# Patient Record
Sex: FEMALE | Race: WHITE | NOT HISPANIC OR LATINO | URBAN - METROPOLITAN AREA
[De-identification: names, ages, dates, MRNs, and addresses within clinical notes are randomized per-mention and may not be internally consistent; named-entity substitution may affect disease eponyms.]

---
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
## 2021-06-30 ENCOUNTER — NON-PROVIDER VISIT (OUTPATIENT)
Dept: URGENT CARE | Facility: PHYSICIAN GROUP | Age: 21
End: 2021-06-30

## 2021-06-30 DIAGNOSIS — Z02.1 PRE-EMPLOYMENT DRUG SCREENING: ICD-10-CM

## 2021-06-30 PROCEDURE — 80305 DRUG TEST PRSMV DIR OPT OBS: CPT | Performed by: PHYSICIAN ASSISTANT

## 2021-11-12 ENCOUNTER — OFFICE VISIT (OUTPATIENT)
Dept: URGENT CARE | Facility: CLINIC | Age: 21
End: 2021-11-12
Payer: OTHER MISCELLANEOUS

## 2021-11-12 VITALS
WEIGHT: 100 LBS | BODY MASS INDEX: 17.07 KG/M2 | SYSTOLIC BLOOD PRESSURE: 122 MMHG | OXYGEN SATURATION: 98 % | HEART RATE: 82 BPM | DIASTOLIC BLOOD PRESSURE: 64 MMHG | TEMPERATURE: 98.9 F | RESPIRATION RATE: 16 BRPM | HEIGHT: 64 IN

## 2021-11-12 DIAGNOSIS — G44.209 TENSION HEADACHE: ICD-10-CM

## 2021-11-12 DIAGNOSIS — S16.1XXA STRAIN OF NECK MUSCLE, INITIAL ENCOUNTER: ICD-10-CM

## 2021-11-12 PROCEDURE — 99203 OFFICE O/P NEW LOW 30 MIN: CPT | Performed by: FAMILY MEDICINE

## 2021-11-12 RX ORDER — CYCLOBENZAPRINE HCL 5 MG
5 TABLET ORAL 2 TIMES DAILY PRN
Qty: 5 TABLET | Refills: 0 | Status: SHIPPED | OUTPATIENT
Start: 2021-11-12 | End: 2023-08-03

## 2021-11-12 ASSESSMENT — PAIN SCALES - GENERAL: PAINLEVEL: 5=MODERATE PAIN

## 2021-11-12 NOTE — PROGRESS NOTES
"  Subjective:      21 y.o. female presents to urgent care for migraine that started on Monday. There was no inciting event or trauma at that time. The pain is moving between her neck and temples bilaterally, it's described as pressure like and throbbing, currently rated 5/10. There is associated light sensitivity, but no nausea or noise sensitivity. No aura. Never had a migraine before. She has tried Ibuprofen, advil, heat/ice, and stretching - none of which helped alleviate her symptoms.     Headache red flags  -Rapid increase in headache frequency: no  -Lack of coordination: no  -Localized neurological signs or papilledema: no  -Headaches that awake patient from sleep: no  -Worse headache of their life: yes, although admittedly it is not that bad  -New onset of headache over the age of 50 years old: no  -Rapid onset of headache with strenuous exercise: no  -Current diagnosis of cancer, HIV, or Lyme disease: no    She denies any other questions or concerns at this time.    Current problem list, medication, and past medical/surgical history were reviewed in Epic.    ROS  See HPI     Objective:      /64   Pulse 82   Temp 37.2 °C (98.9 °F) (Temporal)   Resp 16   Ht 1.626 m (5' 4\")   Wt 45.4 kg (100 lb)   SpO2 98%   BMI 17.16 kg/m²     Physical Exam  Constitutional:       General: She is not in acute distress.     Appearance: She is not diaphoretic.   Cardiovascular:      Rate and Rhythm: Normal rate and regular rhythm.      Heart sounds: Normal heart sounds.   Pulmonary:      Effort: Pulmonary effort is normal. No respiratory distress.      Breath sounds: Normal breath sounds.   Musculoskeletal:      Comments: No discolorations or deformities noted to inspection of her neck. She is tender to palpation of her right, cervical region. Muscles in the same area feel very tight.   Neurological:      Mental Status: She is alert.      Comments: Cranial nerves II through XII grossly intact. Equal strength and " sensation to extremities x4.   Psychiatric:         Mood and Affect: Affect normal.         Judgment: Judgment normal.       Assessment/Plan:     1. Strain of neck muscle, initial encounter  2. Tension headache  Negative red flag screening. I believe headache is related to strain of neck muscles. We discussed the use of heat, exercises, ibuprofen, Tylenol, and Flexeril as needed for symptomatic relief.  - cyclobenzaprine (FLEXERIL) 5 mg tablet; Take 1 Tablet by mouth 2 times a day as needed for Moderate Pain or Muscle Spasms.  Dispense: 5 Tablet; Refill: 0      Instructed to return to Urgent Care or nearest Emergency Department if symptoms fail to improve, for any change in condition, further concerns, or new concerning symptoms. Patient states understanding of the plan of care and discharge instructions.    Vicki Jennings M.D.

## 2021-11-12 NOTE — LETTER
November 12, 2021    To Whom It May Concern:         This is confirmation that Mary Edge attended her scheduled appointment with Vicki Jennings M.D. on 11/12/21. She may return to work 11/13/2021 without any restrictions.          If you have any questions please do not hesitate to call me at the phone number listed below.    Sincerely,          Vicki Jennings M.D.  [phone]

## 2022-11-07 ENCOUNTER — OFFICE VISIT (OUTPATIENT)
Dept: URGENT CARE | Facility: PHYSICIAN GROUP | Age: 22
End: 2022-11-07
Payer: OTHER MISCELLANEOUS

## 2022-11-07 ENCOUNTER — APPOINTMENT (OUTPATIENT)
Dept: URGENT CARE | Facility: PHYSICIAN GROUP | Age: 22
End: 2022-11-07
Payer: OTHER MISCELLANEOUS

## 2022-11-07 VITALS
TEMPERATURE: 97.3 F | SYSTOLIC BLOOD PRESSURE: 105 MMHG | OXYGEN SATURATION: 97 % | WEIGHT: 105.2 LBS | HEART RATE: 78 BPM | DIASTOLIC BLOOD PRESSURE: 70 MMHG | BODY MASS INDEX: 17.96 KG/M2 | HEIGHT: 64 IN | RESPIRATION RATE: 18 BRPM

## 2022-11-07 DIAGNOSIS — R68.89 FLU-LIKE SYMPTOMS: ICD-10-CM

## 2022-11-07 PROCEDURE — 99213 OFFICE O/P EST LOW 20 MIN: CPT | Performed by: PHYSICIAN ASSISTANT

## 2022-11-07 NOTE — PROGRESS NOTES
"Subjective:   Mary Edge is a 22 y.o. female who presents for Pharyngitis (X4 sore throat, cough, headache, sore neck. Had fever 3 days Thurs.-Sat. 103 degrees)      HPI  The patient presents to the Urgent Care with complaints of flulike symptoms onset 4 days ago.  She reports of fever for the first 3 days recorded at 103F max.  Fevers resolved.  She has a continued cough and sore throat.  Occasional left chest wall pain and occasional shortness of breath.  Associated nasal congestion and runny nose.  She had an at-home COVID test 2 days ago which was negative. Denies any chest pain, vomiting, diarrhea. Received COVID vaccine.  She used to vape.      Medications:    cyclobenzaprine  ibuprofen Tabs    Allergies: Patient has no known allergies.    Problem List: Mary Edge does not have a problem list on file.    Surgical History:  No past surgical history on file.    Past Social Hx: Mary Edge  reports that she has quit smoking. She has never used smokeless tobacco. She reports that she does not drink alcohol.     Past Family Hx:  Mary Edge family history is not on file.     Problem list, medications, and allergies reviewed by myself today in Epic.     Objective:     /70 (BP Location: Left arm, Patient Position: Sitting, BP Cuff Size: Adult)   Pulse 78   Temp 36.3 °C (97.3 °F) (Temporal)   Resp 18   Ht 1.626 m (5' 4\")   Wt 47.7 kg (105 lb 3.2 oz)   SpO2 97%   BMI 18.06 kg/m²     Physical Exam  Vitals reviewed.   Constitutional:       General: She is not in acute distress.     Appearance: Normal appearance. She is not ill-appearing or toxic-appearing.   HENT:      Mouth/Throat:      Pharynx: Uvula midline. Posterior oropharyngeal erythema (trace) present. No pharyngeal swelling, oropharyngeal exudate or uvula swelling.      Tonsils: No tonsillar exudate or tonsillar abscesses.   Eyes:      Conjunctiva/sclera: Conjunctivae normal.      Pupils: Pupils are equal, round, and reactive to " light.   Cardiovascular:      Rate and Rhythm: Normal rate and regular rhythm.      Heart sounds: Normal heart sounds.   Pulmonary:      Effort: Pulmonary effort is normal. No respiratory distress.      Breath sounds: Normal breath sounds. No wheezing, rhonchi or rales.   Musculoskeletal:      Cervical back: Neck supple. No rigidity.   Lymphadenopathy:      Cervical: No cervical adenopathy.   Skin:     General: Skin is warm and dry.   Neurological:      General: No focal deficit present.      Mental Status: She is alert and oriented to person, place, and time.   Psychiatric:         Mood and Affect: Mood normal.         Behavior: Behavior normal.     Strep A: Negative  Influenza: Negative    COVID Antigen: Negative      Diagnosis and associated orders:     1. Flu-like symptoms  - POCT Influenza A/B  - POCT Rapid Strep A  - POCT SARS-COV Antigen CYNDY (Symptomatic only)     Comments/MDM:     Most likely viral etiology.   Symptomatic and supportive care:   Plenty of oral hydration and rest   Over the counter cough suppressant as directed.  Tylenol or ibuprofen for pain and fever as directed.   Warm salt water gargles for sore throat, soft foods, cool liquids.   Saline nasal spray and Flonase  Infection control measures at home. Stay away from people, Hand washing, covering sneeze/cough, disinfect surfaces.   Remain home from work, school, and other populated environments. Work note provided with information of quarantine measures per CDC guidelines.   Overall, the patient is well-appearing. They are not hypoxic, afebrile, and a normal pulmonary exam.     I personally reviewed prior external notes and test results pertinent to today's visit. Pathogenesis of diagnosis discussed including typical length and natural progression. Supportive care, natural history, differential diagnoses, and indications for immediate follow-up discussed. Patient expresses understanding and agrees to plan. Patient denies any other questions or  concerns.     Follow-up with the primary care physician for recheck, reevaluation, and consideration of further management.    Please note that this dictation was created using voice recognition software. I have made a reasonable attempt to correct obvious errors, but I expect that there are errors of grammar and possibly content that I did not discover before finalizing the note.    This note was electronically signed by Ziyad Kelsey PA-C

## 2022-11-07 NOTE — LETTER
November 7, 2022         Patient: Mary Edge   YOB: 2000   Date of Visit: 11/7/2022           To Whom it May Concern:    Mary Edge was seen in my clinic on 11/7/2022. Please excuse from work. May return on 11/09.     If you have any questions or concerns, please don't hesitate to call.        Sincerely,           Ziyad Kelsey P.A.-C.  Electronically Signed

## 2023-08-03 ENCOUNTER — OFFICE VISIT (OUTPATIENT)
Dept: URGENT CARE | Facility: PHYSICIAN GROUP | Age: 23
End: 2023-08-03
Payer: COMMERCIAL

## 2023-08-03 ENCOUNTER — HOSPITAL ENCOUNTER (OUTPATIENT)
Dept: RADIOLOGY | Facility: MEDICAL CENTER | Age: 23
End: 2023-08-03
Payer: COMMERCIAL

## 2023-08-03 VITALS
BODY MASS INDEX: 19.12 KG/M2 | WEIGHT: 112 LBS | HEART RATE: 69 BPM | RESPIRATION RATE: 18 BRPM | SYSTOLIC BLOOD PRESSURE: 114 MMHG | HEIGHT: 64 IN | OXYGEN SATURATION: 98 % | TEMPERATURE: 97.9 F | DIASTOLIC BLOOD PRESSURE: 70 MMHG

## 2023-08-03 DIAGNOSIS — M54.50 ACUTE MIDLINE LOW BACK PAIN WITHOUT SCIATICA: ICD-10-CM

## 2023-08-03 LAB
APPEARANCE UR: NORMAL
BILIRUB UR STRIP-MCNC: NORMAL MG/DL
COLOR UR AUTO: YELLOW
GLUCOSE UR STRIP.AUTO-MCNC: NORMAL MG/DL
KETONES UR STRIP.AUTO-MCNC: NORMAL MG/DL
LEUKOCYTE ESTERASE UR QL STRIP.AUTO: NORMAL
NITRITE UR QL STRIP.AUTO: NORMAL
PH UR STRIP.AUTO: 7 [PH] (ref 5–8)
POCT INT CON NEG: NEGATIVE
POCT INT CON POS: POSITIVE
POCT URINE PREGNANCY TEST: NEGATIVE
PROT UR QL STRIP: NORMAL MG/DL
RBC UR QL AUTO: NORMAL
SP GR UR STRIP.AUTO: 1.02
UROBILINOGEN UR STRIP-MCNC: 2 MG/DL

## 2023-08-03 PROCEDURE — 3074F SYST BP LT 130 MM HG: CPT

## 2023-08-03 PROCEDURE — 81025 URINE PREGNANCY TEST: CPT

## 2023-08-03 PROCEDURE — 99213 OFFICE O/P EST LOW 20 MIN: CPT

## 2023-08-03 PROCEDURE — 72100 X-RAY EXAM L-S SPINE 2/3 VWS: CPT

## 2023-08-03 PROCEDURE — 81002 URINALYSIS NONAUTO W/O SCOPE: CPT

## 2023-08-03 PROCEDURE — 3078F DIAST BP <80 MM HG: CPT

## 2023-08-03 RX ORDER — CYCLOBENZAPRINE HCL 5 MG
5-10 TABLET ORAL
Qty: 15 TABLET | Refills: 0 | Status: SHIPPED | OUTPATIENT
Start: 2023-08-03

## 2023-08-03 RX ORDER — PREDNISONE 10 MG/1
20 TABLET ORAL DAILY
Qty: 10 TABLET | Refills: 0 | Status: SHIPPED | OUTPATIENT
Start: 2023-08-03 | End: 2023-08-08

## 2023-08-03 RX ORDER — KETOROLAC TROMETHAMINE 30 MG/ML
15 INJECTION, SOLUTION INTRAMUSCULAR; INTRAVENOUS ONCE
Status: COMPLETED | OUTPATIENT
Start: 2023-08-03 | End: 2023-08-03

## 2023-08-03 RX ADMIN — KETOROLAC TROMETHAMINE 15 MG: 30 INJECTION, SOLUTION INTRAMUSCULAR; INTRAVENOUS at 16:14

## 2023-08-03 NOTE — PROGRESS NOTES
"Chief Complaint   Patient presents with    Abdominal Pain     Tingle sensation and pop. Comes in waves. Using ice but hurts to stand, left thigh tingling  X1 month         Subjective:   HISTORY OF PRESENT ILLNESS: Mary Edge is a 23 y.o. female who presents for low back pain x 1 month.  She was sitting at her desk at work and felt a pop in her lower back and was unable to walk for about 2.5 hours and then improved over time with conservative measure.  The pain  has ramped up again about 4 days ago.  No new aggravating factors  No fevers, no incontinence of urine or stool, no saddle anesthesia, 1 episode of left thigh tingling but that resolved.         Medications, Allergies, current problem list, Social and Family history reviewed today in Epic.     Objective:     /70 (BP Location: Left arm, Patient Position: Sitting, BP Cuff Size: Adult)   Pulse 69   Temp 36.6 °C (97.9 °F) (Temporal)   Resp 18   Ht 1.626 m (5' 4\")   Wt 50.8 kg (112 lb)   SpO2 98%     Physical Exam  Vitals reviewed.   Constitutional:       Appearance: Normal appearance.   HENT:      Mouth/Throat:      Mouth: Mucous membranes are moist.   Cardiovascular:      Rate and Rhythm: Normal rate.   Pulmonary:      Effort: Pulmonary effort is normal.   Musculoskeletal:      Comments: 5/5 strength in all extremities,  midline lumbar tenderness without step offs or edema, skin intact.    Skin:     General: Skin is warm and dry.   Neurological:      Mental Status: She is alert and oriented to person, place, and time.      Cranial Nerves: Cranial nerves 2-12 are intact.      Sensory: Sensation is intact.      Motor: Motor function is intact.      Coordination: Coordination is intact.      Gait: Gait is intact.   Psychiatric:         Mood and Affect: Mood normal.          Assessment/Plan:     Diagnosis and associated orders    1. Acute midline low back pain without sciatica  DX-LUMBAR SPINE-2 OR 3 VIEWS    POCT Urinalysis    POCT Pregnancy    " ketorolac (Toradol) injection 15 mg    cyclobenzaprine (FLEXERIL) 5 mg tablet    predniSONE (DELTASONE) 10 MG Tab    Referral to Physical Therapy          I personally reviewed prior external notes and test results pertinent to today's visit.       IMPRESSION: Patient is non-toxic appearing and suitable for outpatient care at this time.  Symptoms and exam are consistent with muscle strain.   Exam findings reassuring with no motor weakness or no sensory deficits.  We will treat with muscle relaxes, toradol and a shoort course of prednisone. This is recurrent and ongoing for about 1 month, I have referred her to PT. And instructed to follow up with her PCP    Educated on red flag symptoms and Instructed patient to return to Urgent Care or nearest Emergency Department if symptoms fail to improve, for any change in condition, further concerns, or new concerning symptoms. Patient states understanding of the plan of care and discharge instructions.        Please note that this dictation was created using voice recognition software. I have made a reasonable attempt to correct obvious errors, but I expect that there are errors of grammar and possibly content that I did not discover before finalizing the note.    This note was electronically signed by SANDY Diego

## 2024-03-12 ENCOUNTER — OFFICE VISIT (OUTPATIENT)
Dept: URGENT CARE | Facility: PHYSICIAN GROUP | Age: 24
End: 2024-03-12
Payer: COMMERCIAL

## 2024-03-12 VITALS
OXYGEN SATURATION: 98 % | HEART RATE: 78 BPM | HEIGHT: 64 IN | SYSTOLIC BLOOD PRESSURE: 108 MMHG | DIASTOLIC BLOOD PRESSURE: 60 MMHG | RESPIRATION RATE: 12 BRPM | BODY MASS INDEX: 20.66 KG/M2 | WEIGHT: 121 LBS | TEMPERATURE: 97.9 F

## 2024-03-12 DIAGNOSIS — J01.90 ACUTE RHINOSINUSITIS: ICD-10-CM

## 2024-03-12 DIAGNOSIS — H93.8X3 SENSATION OF FULLNESS IN BOTH EARS: ICD-10-CM

## 2024-03-12 PROCEDURE — 3074F SYST BP LT 130 MM HG: CPT | Performed by: STUDENT IN AN ORGANIZED HEALTH CARE EDUCATION/TRAINING PROGRAM

## 2024-03-12 PROCEDURE — 3078F DIAST BP <80 MM HG: CPT | Performed by: STUDENT IN AN ORGANIZED HEALTH CARE EDUCATION/TRAINING PROGRAM

## 2024-03-12 PROCEDURE — 99213 OFFICE O/P EST LOW 20 MIN: CPT | Performed by: STUDENT IN AN ORGANIZED HEALTH CARE EDUCATION/TRAINING PROGRAM

## 2024-03-12 RX ORDER — AMOXICILLIN AND CLAVULANATE POTASSIUM 875; 125 MG/1; MG/1
1 TABLET, FILM COATED ORAL 2 TIMES DAILY
Qty: 14 TABLET | Refills: 0 | Status: SHIPPED | OUTPATIENT
Start: 2024-03-12 | End: 2024-03-19

## 2024-03-12 RX ORDER — FLUTICASONE PROPIONATE 50 MCG
1 SPRAY, SUSPENSION (ML) NASAL DAILY
Qty: 16 G | Refills: 0 | Status: SHIPPED | OUTPATIENT
Start: 2024-03-12

## 2024-03-12 ASSESSMENT — ENCOUNTER SYMPTOMS
FEVER: 0
WHEEZING: 0
SHORTNESS OF BREATH: 0
CHILLS: 0
SORE THROAT: 0
COUGH: 1
PALPITATIONS: 0
SINUS PAIN: 1

## 2024-03-12 NOTE — PROGRESS NOTES
"Subjective     Mary Edge is a 23 y.o. female who presents with Ear Fullness (Have been very muffled and has been congested for about 10 days patient states no home remedies are helping any longer. Patient has done everything including ear drops and that has not helped. )            Mary is a 23 y.o. female who presents to urgent care with bilateral ear fullness.  Patient reports hearing muffled in bilateral ears.  Symptoms started initially 10 days ago.  Patient is also experiencing associated nasal congestion and sinus pain/pressure.  Patient has been taking OTC cold/flu medications and OTC decongestant with minimal relief of symptoms.  Patient has also tried cleaning out her ears with hydrogen peroxide and OTC eardrops with no relief.        Review of Systems   Constitutional:  Positive for malaise/fatigue. Negative for chills and fever.   HENT:  Positive for congestion and sinus pain. Negative for ear pain and sore throat.    Respiratory:  Positive for cough. Negative for shortness of breath and wheezing.    Cardiovascular:  Negative for chest pain and palpitations.   All other systems reviewed and are negative.             Objective     /60   Pulse 78   Temp 36.6 °C (97.9 °F) (Temporal)   Resp 12   Ht 1.626 m (5' 4\")   Wt 54.9 kg (121 lb)   LMP 03/05/2024 (Approximate)   SpO2 98%   BMI 20.77 kg/m²      Physical Exam  Vitals reviewed.   Constitutional:       General: She is not in acute distress.     Appearance: Normal appearance. She is not toxic-appearing.   HENT:      Head: Normocephalic and atraumatic.      Right Ear: Tympanic membrane, ear canal and external ear normal.      Left Ear: Tympanic membrane, ear canal and external ear normal.      Nose: Congestion and rhinorrhea present.      Right Sinus: Maxillary sinus tenderness present.      Left Sinus: Maxillary sinus tenderness present.      Mouth/Throat:      Lips: Pink.      Mouth: Mucous membranes are moist.      Pharynx: Oropharynx " is clear. Uvula midline.   Eyes:      Extraocular Movements: Extraocular movements intact.      Conjunctiva/sclera: Conjunctivae normal.      Pupils: Pupils are equal, round, and reactive to light.   Cardiovascular:      Rate and Rhythm: Normal rate and regular rhythm.   Pulmonary:      Effort: Pulmonary effort is normal.      Breath sounds: Normal breath sounds.   Skin:     General: Skin is warm and dry.   Neurological:      General: No focal deficit present.      Mental Status: She is alert.                             Assessment & Plan        1. Acute rhinosinusitis  - amoxicillin-clavulanate (AUGMENTIN) 875-125 MG Tab; Take 1 Tablet by mouth 2 times a day for 7 days.  Dispense: 14 Tablet; Refill: 0  - fluticasone (FLONASE) 50 MCG/ACT nasal spray; Administer 1 Spray into affected nostril(S) every day.  Dispense: 16 g; Refill: 0    2. Sensation of fullness in both ears      Differential diagnoses, supportive care measures and indications for immediate follow-up discussed with patient. Pathogenesis of diagnosis discussed including typical length and natural progression.      Instructed to return to urgent care or nearest emergency department if symptoms fail to improve, for any change in condition, further concerns, or new concerning symptoms.    Patient states understanding and agrees with the plan of care and discharge instructions.

## 2024-06-17 ENCOUNTER — OFFICE VISIT (OUTPATIENT)
Dept: URGENT CARE | Facility: PHYSICIAN GROUP | Age: 24
End: 2024-06-17
Payer: COMMERCIAL

## 2024-06-17 VITALS
DIASTOLIC BLOOD PRESSURE: 58 MMHG | WEIGHT: 122 LBS | HEIGHT: 64 IN | RESPIRATION RATE: 18 BRPM | BODY MASS INDEX: 20.83 KG/M2 | TEMPERATURE: 97.6 F | SYSTOLIC BLOOD PRESSURE: 116 MMHG | HEART RATE: 77 BPM | OXYGEN SATURATION: 98 %

## 2024-06-17 DIAGNOSIS — J02.9 ACUTE PHARYNGITIS, UNSPECIFIED ETIOLOGY: ICD-10-CM

## 2024-06-17 DIAGNOSIS — B34.9 NONSPECIFIC SYNDROME SUGGESTIVE OF VIRAL ILLNESS: ICD-10-CM

## 2024-06-17 LAB
FLUAV RNA SPEC QL NAA+PROBE: NEGATIVE
FLUBV RNA SPEC QL NAA+PROBE: NEGATIVE
RSV RNA SPEC QL NAA+PROBE: NEGATIVE
S PYO DNA SPEC NAA+PROBE: NOT DETECTED
SARS-COV-2 RNA RESP QL NAA+PROBE: NEGATIVE

## 2024-06-17 PROCEDURE — 87651 STREP A DNA AMP PROBE: CPT

## 2024-06-17 PROCEDURE — 3078F DIAST BP <80 MM HG: CPT

## 2024-06-17 PROCEDURE — 0241U POCT CEPHEID COV-2, FLU A/B, RSV - PCR: CPT

## 2024-06-17 PROCEDURE — 99214 OFFICE O/P EST MOD 30 MIN: CPT

## 2024-06-17 PROCEDURE — 3074F SYST BP LT 130 MM HG: CPT

## 2024-06-17 ASSESSMENT — ENCOUNTER SYMPTOMS
VOMITING: 0
SORE THROAT: 1
FEVER: 0
CHILLS: 0
NAUSEA: 0

## 2024-06-17 NOTE — PROGRESS NOTES
"  CHIEF COMPLAINT  Chief Complaint   Patient presents with    Pharyngitis     X 1 day Sore throat, headache, ears congested     Subjective:   Mary Edge is a 23 y.o. female who presents to urgent care with concerns for sore throat x 1 day.  Patient also reports associated symptoms of headache, earache and nasal congestion.  She denies any symptoms of fever or chills.  She denies any nausea or vomiting.  No other pertinent past medical history.          Review of Systems   Constitutional:  Negative for chills and fever.   HENT:  Positive for congestion and sore throat.    Gastrointestinal:  Negative for nausea and vomiting.       PAST MEDICAL HISTORY  There are no problems to display for this patient.      SURGICAL HISTORY  patient denies any surgical history    ALLERGIES  No Known Allergies    CURRENT MEDICATIONS  Home Medications       Reviewed by Vince Obando'osbaldo (Medical Assistant) on 06/17/24 at 0924  Med List Status: <None>     Medication Last Dose Status   cyclobenzaprine (FLEXERIL) 5 mg tablet Not Taking Active   fluticasone (FLONASE) 50 MCG/ACT nasal spray Not Taking Active   ibuprofen (MOTRIN) 800 MG Tab Not Taking Active                    SOCIAL HISTORY  Social History     Tobacco Use    Smoking status: Former    Smokeless tobacco: Never   Vaping Use    Vaping status: Former    Quit date: 8/1/2023   Substance and Sexual Activity    Alcohol use: Yes     Alcohol/week: 4.2 oz     Types: 7 Cans of beer per week     Comment: beer every day    Drug use: Never    Sexual activity: Yes     Partners: Male     Birth control/protection: Condom       FAMILY HISTORY  History reviewed. No pertinent family history.      Medications, Allergies, and current problem list reviewed today in Epic.     Objective:     /58   Pulse 77   Temp 36.4 °C (97.6 °F) (Temporal)   Resp 18   Ht 1.626 m (5' 4\")   Wt 55.3 kg (122 lb)   SpO2 98%     Physical Exam  Vitals reviewed.   Constitutional:       General: " She is not in acute distress.     Appearance: Normal appearance. She is not ill-appearing or toxic-appearing.   HENT:      Head: Normocephalic.      Right Ear: Tympanic membrane normal.      Left Ear: Tympanic membrane normal.      Nose: Nose normal.      Mouth/Throat:      Mouth: Mucous membranes are moist.      Pharynx: Oropharynx is clear. Uvula midline. Posterior oropharyngeal erythema present. No oropharyngeal exudate.      Tonsils: 1+ on the right. 1+ on the left.   Cardiovascular:      Rate and Rhythm: Normal rate and regular rhythm.      Pulses: Normal pulses.      Heart sounds: Normal heart sounds.   Pulmonary:      Effort: Pulmonary effort is normal. No respiratory distress.      Breath sounds: Normal breath sounds. No stridor. No wheezing, rhonchi or rales.   Musculoskeletal:      Cervical back: Normal range of motion. No rigidity or tenderness.   Lymphadenopathy:      Cervical: No cervical adenopathy.   Skin:     General: Skin is warm.      Capillary Refill: Capillary refill takes less than 2 seconds.   Neurological:      General: No focal deficit present.      Mental Status: She is alert.   Psychiatric:         Mood and Affect: Mood normal.         Lab Results/POC Test Results   Results for orders placed or performed in visit on 06/17/24   POCT GROUP A STREP, PCR   Result Value Ref Range    POC Group A Strep, PCR Not Detected Not Detected, Invalid   POCT CoV-2, Flu A/B, RSV by PCR   Result Value Ref Range    SARS-CoV-2 by PCR Negative Negative, Invalid    Influenza virus A RNA Negative Negative, Invalid    Influenza virus B, PCR Negative Negative, Invalid    RSV, PCR Negative Negative, Invalid          Assessment/Plan:     Diagnosis and associated orders:     1. Acute pharyngitis, unspecified etiology  POCT GROUP A STREP, PCR    POCT CoV-2, Flu A/B, RSV by PCR      2. Nonspecific syndrome suggestive of viral illness           Comments/MDM:     The patient presents with symptoms suspicious for likely  viral upper respiratory infection. Differential includes bacterial pneumonia, sinusitis, allergic rhinitis. Patient is nontoxic appearing and not in need of emergent medical intervention. They have a normal pulse oximetry on room air, afebrile, and a normal pulmonary exam. Overall, the patient is very well appearing. I do not feel that this patient would benefit from antibiotics at this time.   Recommended symptomatic and supportive care at this time that includes plenty of fluids, rest, Tylenol/Ibuprofen for pain/fever, warm salt water gargles for sore throat, OTC cough and decongestant medication, Flonase, nasal saline washes.    Red flag signs and symptoms discussed.  Instructed to return to ER or urgent care if symptoms worsen or fail to improve.          Differential diagnosis, natural history, supportive care, and indications for immediate follow-up discussed.    Advised the patient to follow-up with the primary care physician for recheck, reevaluation, and consideration of further management.    Please note that this dictation was created using voice recognition software. I have made a reasonable attempt to correct obvious errors, but I expect that there are errors of grammar and possibly content that I did not discover before finalizing the note.    This note was electronically signed by SANDY Dove

## 2024-06-20 ENCOUNTER — OFFICE VISIT (OUTPATIENT)
Dept: URGENT CARE | Facility: CLINIC | Age: 24
End: 2024-06-20
Payer: COMMERCIAL

## 2024-06-20 ENCOUNTER — HOSPITAL ENCOUNTER (OUTPATIENT)
Facility: MEDICAL CENTER | Age: 24
End: 2024-06-20
Payer: COMMERCIAL

## 2024-06-20 VITALS
WEIGHT: 121 LBS | DIASTOLIC BLOOD PRESSURE: 70 MMHG | HEART RATE: 82 BPM | SYSTOLIC BLOOD PRESSURE: 102 MMHG | BODY MASS INDEX: 20.66 KG/M2 | HEIGHT: 64 IN | RESPIRATION RATE: 16 BRPM | TEMPERATURE: 97.6 F | OXYGEN SATURATION: 97 %

## 2024-06-20 DIAGNOSIS — J03.90 TONSILLITIS: ICD-10-CM

## 2024-06-20 LAB
HETEROPH AB SER QL LA: NEGATIVE
POCT INT CON NEG: NEGATIVE
POCT INT CON POS: POSITIVE

## 2024-06-20 PROCEDURE — 3074F SYST BP LT 130 MM HG: CPT

## 2024-06-20 PROCEDURE — 87070 CULTURE OTHR SPECIMN AEROBIC: CPT

## 2024-06-20 PROCEDURE — 99214 OFFICE O/P EST MOD 30 MIN: CPT

## 2024-06-20 PROCEDURE — 3078F DIAST BP <80 MM HG: CPT

## 2024-06-20 PROCEDURE — 86308 HETEROPHILE ANTIBODY SCREEN: CPT

## 2024-06-20 RX ORDER — LIDOCAINE HYDROCHLORIDE 20 MG/ML
15 SOLUTION OROPHARYNGEAL PRN
Qty: 100 ML | Refills: 0 | Status: SHIPPED | OUTPATIENT
Start: 2024-06-20

## 2024-06-20 RX ORDER — DEXAMETHASONE SODIUM PHOSPHATE 10 MG/ML
10 INJECTION INTRAMUSCULAR; INTRAVENOUS ONCE
Status: COMPLETED | OUTPATIENT
Start: 2024-06-20 | End: 2024-06-20

## 2024-06-20 RX ADMIN — DEXAMETHASONE SODIUM PHOSPHATE 10 MG: 10 INJECTION INTRAMUSCULAR; INTRAVENOUS at 20:06

## 2024-06-20 ASSESSMENT — ENCOUNTER SYMPTOMS
FEVER: 0
CHILLS: 1
COUGH: 1
MYALGIAS: 1
SORE THROAT: 1

## 2024-06-21 DIAGNOSIS — J03.90 TONSILLITIS: ICD-10-CM

## 2024-06-21 NOTE — PROGRESS NOTES
Subjective:     CHIEF COMPLAINT  Chief Complaint   Patient presents with    Pharyngitis     Throat pain and difficulty swallowing. Seen at [facility] on Monday and negative for strep and Covid/Flu/RSV, but it's getting worse       HPI  Mary Edge is a very pleasant 23 y.o. female who presents accompanied by her significant other with a sore throat, congestion, and cough.  She was previously seen in the urgent care on 6/17/2024 and had negative viral testing for COVID, influenza, and RSV as well as negative strep testing.  She has been performing warm salt water gargles, but reports that her sore throat has been progressively worsening.  She has had trouble eating and drinking.  She has not had any fevers.    REVIEW OF SYSTEMS  Review of Systems   Constitutional:  Positive for chills. Negative for fever.   HENT:  Positive for congestion and sore throat.    Respiratory:  Positive for cough.    Musculoskeletal:  Positive for myalgias.       PAST MEDICAL HISTORY  There are no problems to display for this patient.      SURGICAL HISTORY  patient denies any surgical history    ALLERGIES  No Known Allergies    CURRENT MEDICATIONS  Home Medications       Reviewed by Scarlett Snyder P.A.-C. (Physician Assistant) on 06/20/24 at 1951  Med List Status: <None>     Medication Last Dose Status        Patient Juan Alberto Taking any Medications                           SOCIAL HISTORY  Social History     Tobacco Use    Smoking status: Former    Smokeless tobacco: Never   Vaping Use    Vaping status: Former    Quit date: 8/1/2023   Substance and Sexual Activity    Alcohol use: Yes     Alcohol/week: 4.2 oz     Types: 7 Cans of beer per week     Comment: beer every day    Drug use: Never    Sexual activity: Yes     Partners: Male     Birth control/protection: Condom       FAMILY HISTORY  History reviewed. No pertinent family history.       Objective:     VITAL SIGNS: /70 (BP Location: Left arm, Patient Position:  "Sitting, BP Cuff Size: Adult)   Pulse 82   Temp 36.4 °C (97.6 °F) (Temporal)   Resp 16   Ht 1.626 m (5' 4\")   Wt 54.9 kg (121 lb)   SpO2 97%   BMI 20.77 kg/m²     PHYSICAL EXAM  Physical Exam  Vitals reviewed. Exam conducted with a chaperone present.   Constitutional:       General: She is not in acute distress.     Appearance: Normal appearance. She is not ill-appearing or toxic-appearing.   HENT:      Head: Normocephalic and atraumatic.      Right Ear: Tympanic membrane, ear canal and external ear normal.      Left Ear: Tympanic membrane, ear canal and external ear normal.      Nose: Congestion present.      Mouth/Throat:      Mouth: Mucous membranes are moist.      Pharynx: Oropharyngeal exudate and posterior oropharyngeal erythema present.      Comments: Uvula midline.  Tonsils 2+ bilaterally with exudate present.  Pharyngeal swelling is present.  Airways patent.  No evidence of peritonsillar abscess.  Eyes:      Conjunctiva/sclera: Conjunctivae normal.      Pupils: Pupils are equal, round, and reactive to light.   Cardiovascular:      Rate and Rhythm: Normal rate and regular rhythm.      Heart sounds: Normal heart sounds.   Pulmonary:      Effort: Pulmonary effort is normal. No respiratory distress.      Breath sounds: Normal breath sounds. No stridor. No wheezing, rhonchi or rales.   Lymphadenopathy:      Cervical: Cervical adenopathy present.   Skin:     General: Skin is warm and dry.      Coloration: Skin is not pale.      Findings: No rash.   Neurological:      General: No focal deficit present.      Mental Status: She is alert and oriented to person, place, and time.   Psychiatric:         Mood and Affect: Mood normal.         Assessment/Plan:     1. Tonsillitis  - CULTURE THROAT; Future  - POCT Mononucleosis (mono)  - dexamethasone (Decadron) injection (check route below) 10 mg  - lidocaine (XYLOCAINE) 2 % Solution; Take 15 mL by mouth as needed for Throat/Mouth Pain (Swish, gargle, and spit up to " 5 times daily for sore throat).  Dispense: 100 mL; Refill: 0  -Warm salt water gargles as needed for sore throat  -Tylenol OTC as needed for pain  -Return to clinic if symptoms worsen or fail to resolve      MDM/Comments:  Patient has stable vital signs and is non-toxic appearing.  Mono testing performed in office with negative results.  Throat culture obtained.  Patient provided an oral dose of Decadron for tonsillitis.  Lidocaine mouthwash provided for symptomatic relief of pain.  Discussed supportive care measures with warm salt water gargles, rest, tylenol OTC as needed for pain, and maintaining adequate hydration. Patient demonstrated understanding of treatment plan and agreed to return to the clinic if symptoms worsen or fail to resolve.     Differential diagnosis, natural history, supportive care, and indications for immediate follow-up discussed. All questions answered. Patient agrees with the plan of care.    Follow-up as needed if symptoms worsen or fail to improve to PCP, Urgent care or Emergency Room.    I have personally reviewed prior external notes and test results pertinent to today's visit.  I have independently reviewed and interpreted all diagnostics ordered during this urgent care acute visit.   Discussed management options (risks,benefits, and alternatives to treatment). Pt expresses understanding and the treatment plan was agreed upon. Questions were encouraged and answered to pt's satisfaction.    Please note that this dictation was created using voice recognition software. I have made a reasonable attempt to correct obvious errors, but I expect that there are errors of grammar and possibly content that I did not discover before finalizing the note.

## 2024-06-23 LAB
BACTERIA SPEC RESP CULT: NORMAL
SIGNIFICANT IND 70042: NORMAL
SITE SITE: NORMAL
SOURCE SOURCE: NORMAL

## 2024-09-19 RX ORDER — LIDOCAINE HYDROCHLORIDE 20 MG/ML
SOLUTION OROPHARYNGEAL
Qty: 100 ML | Refills: 0 | OUTPATIENT
Start: 2024-09-19

## 2024-10-03 ENCOUNTER — HOSPITAL ENCOUNTER (OUTPATIENT)
Dept: RADIOLOGY | Facility: MEDICAL CENTER | Age: 24
End: 2024-10-03
Payer: COMMERCIAL

## 2024-10-03 ENCOUNTER — OFFICE VISIT (OUTPATIENT)
Dept: URGENT CARE | Facility: PHYSICIAN GROUP | Age: 24
End: 2024-10-03
Payer: COMMERCIAL

## 2024-10-03 VITALS
RESPIRATION RATE: 13 BRPM | OXYGEN SATURATION: 99 % | BODY MASS INDEX: 21.02 KG/M2 | SYSTOLIC BLOOD PRESSURE: 126 MMHG | HEIGHT: 64 IN | HEART RATE: 76 BPM | TEMPERATURE: 98.8 F | DIASTOLIC BLOOD PRESSURE: 82 MMHG | WEIGHT: 123.1 LBS

## 2024-10-03 DIAGNOSIS — R07.9 CHEST PAIN, UNSPECIFIED TYPE: ICD-10-CM

## 2024-10-03 DIAGNOSIS — R20.0 RIGHT ARM NUMBNESS: ICD-10-CM

## 2024-10-03 PROCEDURE — 93000 ELECTROCARDIOGRAM COMPLETE: CPT

## 2024-10-03 PROCEDURE — 99215 OFFICE O/P EST HI 40 MIN: CPT

## 2024-10-03 PROCEDURE — 3079F DIAST BP 80-89 MM HG: CPT

## 2024-10-03 PROCEDURE — 71046 X-RAY EXAM CHEST 2 VIEWS: CPT

## 2024-10-03 PROCEDURE — 3074F SYST BP LT 130 MM HG: CPT

## 2024-10-03 ASSESSMENT — ENCOUNTER SYMPTOMS
HEARTBURN: 1
FEVER: 0
SHORTNESS OF BREATH: 0

## 2025-06-17 ENCOUNTER — HOSPITAL ENCOUNTER (OUTPATIENT)
Dept: RADIOLOGY | Facility: MEDICAL CENTER | Age: 25
End: 2025-06-17
Payer: COMMERCIAL

## 2025-06-17 ENCOUNTER — OFFICE VISIT (OUTPATIENT)
Dept: URGENT CARE | Facility: PHYSICIAN GROUP | Age: 25
End: 2025-06-17
Payer: COMMERCIAL

## 2025-06-17 VITALS
HEART RATE: 71 BPM | HEIGHT: 64 IN | TEMPERATURE: 97.3 F | BODY MASS INDEX: 21.51 KG/M2 | WEIGHT: 126 LBS | OXYGEN SATURATION: 99 % | RESPIRATION RATE: 18 BRPM | DIASTOLIC BLOOD PRESSURE: 56 MMHG | SYSTOLIC BLOOD PRESSURE: 110 MMHG

## 2025-06-17 DIAGNOSIS — S69.92XA INJURY OF LEFT THUMB, INITIAL ENCOUNTER: Primary | ICD-10-CM

## 2025-06-17 PROCEDURE — 99214 OFFICE O/P EST MOD 30 MIN: CPT

## 2025-06-17 PROCEDURE — 73140 X-RAY EXAM OF FINGER(S): CPT | Mod: LT

## 2025-06-17 PROCEDURE — 3074F SYST BP LT 130 MM HG: CPT

## 2025-06-17 PROCEDURE — 3078F DIAST BP <80 MM HG: CPT

## 2025-06-17 ASSESSMENT — ENCOUNTER SYMPTOMS
VOMITING: 0
FEVER: 0
WEAKNESS: 0
DIARRHEA: 0
SHORTNESS OF BREATH: 0
DIZZINESS: 0
COUGH: 0
NAUSEA: 0

## 2025-06-17 NOTE — PROGRESS NOTES
"Subjective     Mary Edge is a 24 y.o. female who presents with Hand Injury (X 2 months ago jammed Lft thumb still having tenderness)            Was playing football ~2 months ago and when she caught the ball her thumb bent the wrong way/jammed   dad did not think it was broken at the time  Took 800mg ibuprofen day of injury  Iced injury for 2 weeks  Had bruising for about 2 weeks   Improved 50% from initial injury but has been at plateau for 6 weeks  Decreased range of motion states she \"used to be double jointed\" but now is not, feels weak        Review of Systems   Constitutional:  Negative for fever and malaise/fatigue.   HENT:  Negative for congestion.    Respiratory:  Negative for cough and shortness of breath.    Cardiovascular:  Negative for chest pain.   Gastrointestinal:  Negative for diarrhea, nausea and vomiting.   Musculoskeletal:  Positive for joint pain.   Skin:  Negative for rash.   Neurological:  Negative for dizziness and weakness.   All other systems reviewed and are negative.             Objective     /56   Pulse 71   Temp 36.3 °C (97.3 °F)   Resp 18   Ht 1.626 m (5' 4\")   Wt 57.2 kg (126 lb)   SpO2 99%   BMI 21.63 kg/m²      Physical Exam  Vitals reviewed.   Constitutional:       Appearance: Normal appearance.   HENT:      Head: Normocephalic.   Eyes:      Extraocular Movements: Extraocular movements intact.      Conjunctiva/sclera: Conjunctivae normal.   Cardiovascular:      Rate and Rhythm: Normal rate.   Pulmonary:      Effort: Pulmonary effort is normal.   Musculoskeletal:      Left hand: No bony tenderness. Decreased range of motion. Decreased strength. Normal sensation. Normal capillary refill. Normal pulse.   Skin:     General: Skin is warm and dry.   Neurological:      Mental Status: She is alert and oriented to person, place, and time.   Psychiatric:         Behavior: Behavior normal.                                  Assessment & Plan  Injury of left thumb, " initial encounter    Orders:    DX-FINGER(S) 2+ LEFT    Referral to Sports Medicine       6/17/2025 1:30 PM     HISTORY/REASON FOR EXAM:  Pain/Deformity Following Trauma        TECHNIQUE/EXAM DESCRIPTION AND NUMBER OF VIEWS:  3 views of the LEFT fingers.     COMPARISON: None     FINDINGS:     No acute fracture or dislocation.     No joint osteoarthritis.     IMPRESSION:     No acute osseous abnormality.      Updated that x-ray was negative for fracture or dislocation. Discussed this could be related to a sprain, strain, or nerve problem.    Encouraged PRICE therapy as well regular ibuprofen to decrease inflammation.    Referral to Sports Med    Shared decision-making was utilized with Mary for plan of care. Discussed differential diagnoses, natural history, and supportive care. Reviewed indication for use and the potential benefits and side effects of medications. Mary was encouraged to monitor symptoms closely and educated about signs and symptoms that would warrant concern and mandate seeking a higher level of service through the emergency department discussed at length. All questions answered to Mary's satisfaction and they were in agreement with treatment plan at time of discharge.

## 2025-06-23 NOTE — Clinical Note
REFERRAL APPROVAL NOTICE         Sent on June 23, 2025                   Mary Edge  [address] [place] [address]                   Dear Ms. Edge,    After a careful review of the medical information and benefit coverage, [facility] has processed your referral. See below for additional details.    If applicable, you must be actively enrolled with your insurance for coverage of the authorized service. If you have any questions regarding your coverage, please contact your insurance directly.    REFERRAL INFORMATION   Referral #:  [id number]  Referred-To Department    Referred-By Provider:  Sports Medicine    SANDY George   [address]      [address]  [place] [address]  [phone] [address] [facility] [place] [id number] [phone]    Referral Start Date:  06/17/2025  Referral End Date:   06/17/2026             SCHEDULING  If you do not already have an appointment, please call [phone] to make an appointment.     MORE INFORMATION  If you do not already have a [employer] account, sign up at: [email or web address]  You can access your medical information, make appointments, see lab results, billing information, and more.  If you have questions regarding this referral, please contact  the [facility] Referrals department at:             [phone]. Monday - Friday 8:00AM - 5:00PM.     Sincerely,    [facility]

## 2025-07-01 ENCOUNTER — APPOINTMENT (OUTPATIENT)
Dept: SPORTS MEDICINE | Facility: OTHER | Age: 25
End: 2025-07-01
Payer: COMMERCIAL

## 2025-07-01 VITALS
HEIGHT: 64 IN | HEART RATE: 74 BPM | SYSTOLIC BLOOD PRESSURE: 116 MMHG | OXYGEN SATURATION: 98 % | DIASTOLIC BLOOD PRESSURE: 70 MMHG | RESPIRATION RATE: 14 BRPM | WEIGHT: 126 LBS | BODY MASS INDEX: 21.51 KG/M2 | TEMPERATURE: 98.1 F
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
## 2025-07-01 DIAGNOSIS — M79.645 THUMB PAIN, LEFT: Primary | ICD-10-CM

## 2025-07-01 PROCEDURE — 3078F DIAST BP <80 MM HG: CPT | Performed by: FAMILY MEDICINE

## 2025-07-01 PROCEDURE — 3074F SYST BP LT 130 MM HG: CPT | Performed by: FAMILY MEDICINE

## 2025-07-01 PROCEDURE — 99214 OFFICE O/P EST MOD 30 MIN: CPT | Performed by: FAMILY MEDICINE

## 2025-07-01 ASSESSMENT — ENCOUNTER SYMPTOMS
CHILLS: 0
NAUSEA: 0
DIZZINESS: 0
SHORTNESS OF BREATH: 0
VOMITING: 0
FEVER: 0

## 2025-07-01 NOTE — PROGRESS NOTES
"Chief Complaint   Patient presents with    Finger Pain     L thumb pain      Subjective   Notes  Referred by AUDIE George  for evaluation of LEFT thumb pain/injury (right-handed dominant)  Date of injury, Sunday, April 28, 2025  Mechanism, playing football and making a catch and jammed LEFT thumb  I took approximately 6 weeks for her to improve from her injury to baseline  Her pain has been persistent and predominantly at the proximal phalanx of the LEFT thumb  Worse with gripping and extending the finger  Her thumb used to be more mobile as she is \"double jointed\"  She has no longer been able to move the thumb which she used to  Pain is 0 out of 10, can be as severe as 5 out of 10 with use of the hand  She was lifting weights and notices that when she , pushes and pulls she experiences some pain  No numbness or tingling   She was seen at urgent care, had x-rays and referred for further evaluation and management  She denies any prior injuries or issues with the LEFT hand  She has tried icing and ibuprofen with limited improvement in symptoms    Also has lumbar spine issues    She has a desk job/computer work  Activities include attending the gym, weightlifting and doing yoga    Review of Systems   Constitutional:  Negative for chills and fever.   Respiratory:  Negative for shortness of breath.    Cardiovascular:  Negative for chest pain.   Gastrointestinal:  Negative for nausea and vomiting.   Neurological:  Negative for dizziness.     PMH:  has no past medical history on file.  MEDS: Current Medications[1]  ALLERGIES: Allergies[2]  SURGHX: Past Surgical History[3]  SOCHX:  reports that she has quit smoking. She has never used smokeless tobacco. She reports that she does not currently use alcohol after a past usage of about 4.2 oz of alcohol per week. She reports that she does not use drugs.  FH: Family history was reviewed, no pertinent findings to report    Objective   /70 (BP Location: Left " "arm, Patient Position: Sitting, BP Cuff Size: Adult)   Pulse 74   Temp 36.7 °C (98.1 °F) (Temporal)   Resp 14   Ht 1.626 m (5' 4\")   Wt 57.2 kg (126 lb)   SpO2 98%   BMI 21.63 kg/m²     Hand exam    NAD  Alert and oriented    BILATERAL WRIST exam  Range of motion intact  No tenderness along scaphoid, TFCC insertion, distal radius or distal ulna  Tinel's testing is NEGATIVE  The hand is otherwise neurovascularly intact    BILATERAL hand exam  NO swelling  NO deformity  Range of motion of all MCP, DIP and PIP joints NORMAL  Thumb opposition is NORMAL  Pinky opposition NORMAL  Grind test is NEGATIVE  Collateral ligament testing is NORMAL and there is no tenderness at the UCL  There is atrophy of the LEFT thenar eminence compared to the right    1. Thumb pain, left        2. 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  See how she is doing after 1 month of hand OT          6/17/2025 1:30 PM     HISTORY/REASON FOR EXAM:  Pain/Deformity Following Trauma        TECHNIQUE/EXAM DESCRIPTION AND NUMBER OF VIEWS:  3 views of the LEFT fingers.     COMPARISON: None     FINDINGS:     No acute fracture or dislocation.     No joint osteoarthritis.     IMPRESSION:           No acute osseous abnormality.        Exam Ended: 06/17/25  1:37 PM Last Resulted: 06/17/25  2:02 PM     Interpreted in the office today with the patient    Thank you AUDIE George for " allowing me to participate in care of your patient.         [1]   Current Outpatient Medications:     lidocaine (XYLOCAINE) 2 % Solution, Take 15 mL by mouth as needed for Throat/Mouth Pain (Swish, gargle, and spit up to 5 times daily for sore throat). (Patient not taking: Reported on 6/17/2025), Disp: 100 mL, Rfl: 0  [2] No Known Allergies  [3] History reviewed. No pertinent surgical history.